# Patient Record
Sex: FEMALE | Race: WHITE | Employment: FULL TIME | URBAN - NONMETROPOLITAN AREA
[De-identification: names, ages, dates, MRNs, and addresses within clinical notes are randomized per-mention and may not be internally consistent; named-entity substitution may affect disease eponyms.]

---

## 2022-12-02 ENCOUNTER — HOSPITAL ENCOUNTER (OUTPATIENT)
Dept: GENERAL RADIOLOGY | Age: 52
End: 2022-12-02
Payer: COMMERCIAL

## 2022-12-02 ENCOUNTER — HOSPITAL ENCOUNTER (OUTPATIENT)
Age: 52
Discharge: HOME | End: 2022-12-02
Payer: COMMERCIAL

## 2022-12-02 DIAGNOSIS — M79.18 MUSCULOSKELETAL PAIN: ICD-10-CM

## 2022-12-02 DIAGNOSIS — M25.511 RIGHT SHOULDER PAIN, UNSPECIFIED CHRONICITY: ICD-10-CM

## 2022-12-02 PROCEDURE — 73030 X-RAY EXAM OF SHOULDER: CPT

## 2023-01-13 ENCOUNTER — HOSPITAL ENCOUNTER (OUTPATIENT)
Dept: PHYSICAL THERAPY | Age: 53
Setting detail: THERAPIES SERIES
Discharge: HOME | End: 2023-01-13
Payer: COMMERCIAL

## 2023-01-13 PROCEDURE — 97161 PT EVAL LOW COMPLEX 20 MIN: CPT

## 2023-01-13 PROCEDURE — 97110 THERAPEUTIC EXERCISES: CPT

## 2023-01-15 NOTE — PROGRESS NOTES
Phone: 468 [facility]          Fax: [phone]                      Outpatient Physical Therapy                                                                      Evaluation  Date: 2023  Patient: Anson Oppenheim  : 1970  [facility] #: [id number]    Referring Physician: Tonny Brito DO     Medical Diagnosis: R shoulder pain/back pain    Treatment Diagnosis: R shoulder pain, R rotator cuff tendinitis  Onset Date: 22  PT Insurance Information: Dagoberto  Total # of Visits Approved: 6   Total # of Visits to Date: 1  No Show: 0  Canceled Appointment: 0     Subjective  Subjective: Patient reports injuring her shoulder when she was attempting to start a lawnmower. She reports anterior and posterior shoulder pain that ranges from 0/10 at best to 9/10 at worst.  Aggravating factors: reaching overhead, reaching her upper back, reaching behind her back, quick sudden movements. She reports decreased pain with rest, medication, heat.   Additional Pertinent Hx: nothing significant      Palpation:   Right Shoulder Palpation: tenderness at proximal biceps, supraspinatus    Objective    AROM    General AROM UE: AROM Assessed  AROM LUE (degrees)  L Shoulder Flexion (0-180): 168  L Shoulder ABduction (0-180): 156  L Shoulder Int Rotation  (0-70): 57  L Shoulder Ext Rotation  (0-90): 80  L Elbow Flexion (0-145): 145  L Elbow Extension (145-0): 0  R shoulder flexion: 108  R shoulder abduction: 78  R shoulder internal rotation: to belly in scapular plane  R shoulder external rotation: 25* in scapular plane  R elbow flexion: 145  R elbow extension: 0       Special Tests:   Special Tests: Performed  Impingement Tests  Neer Test: R (+)  Lv Darian Test: R (+)  Belly Press Test: R (+)  Shawna Sign: R (-)  Drop Arm: R (-)  Empty Can: R (+)          Strength RUE  R Shoulder Flexion: 2+/5  R Shoulder ABduction: 2+/5  R Shoulder Internal Rotation: 4/5  R Shoulder External Rotation: 4+/5  R Elbow Flexion: 4/5  Strength LUE  L Shoulder Flexion: 4+/5  L Shoulder ABduction: 4+/5  L Shoulder Internal Rotation: 5/5  L Shoulder External Rotation: 5/5  L Elbow Flexion: 5/5           Exercises:  Exercise 1: HEP: supine ER with towel x10, table slides x10, posterior capsule stretch 2x30 seconds, scapular retractions x10, shoulder shrugs x10    Manual:  Joint Mobilization: Grade II/III glenohumeral joint mobs: inferior, lateral distraction, posterior  Other: PROM: flexion, external rotation      Functional Outcome Measures  Pt disability report:     Assessment  Body Structures, Functions, Activity Limitations Requiring Skilled Therapeutic Intervention: Decreased functional mobility , Decreased ADL status, Decreased ROM, Decreased strength, Decreased endurance, Decreased high-level IADLs, Increased pain, Decreased posture  Assessment: The patient is a 50 y.o. female who reports injuring her R shoulder in May when pulling to start a lawnmower. She demonstrates decreased R shoulder ROM with increased pain with eccentric lowering, positive impringement signs, and decreased R UE strength. She would benefit from skilled PT to address her deficits to improve functional mobility. Therapy Prognosis: Good        Decision Making: Low Complexity    Patient Education  Patient Education: PT POC, HEP  Pt verbalized/demonstrated good understanding:     [X] Yes         [] No, pt required further clarification.       Goals  Short Term Goals  Time Frame for Short Term Goals: 2 weeks  Short Term Goal 1: Patient will be initiated with a HEP  Short Term Goal 2: Patient will improve R active flexion ROM to >130* for ADLs    Long Term Goals  Time Frame for Long Term Goals : 4 weeks  Long Term Goal 1: Patient will be independent and compliant with a HEP  Long Term Goal 2: Patient will improve R shoulder ROM to match L for ADLs  Long Term Goal 3: Patient will improve R shoulder strength to </= 4/5 in all major joints and planes  Long Term Goal 4: Patient will report 70% improvement in overall symptoms and function.     Patient Goals : Decrease pain    Minutes Tracking:  Time In: 3864  Time Out: 4011  Minutes: 51  Timed Code Treatment Minutes: 1031 Cooper Belcher, JANET    1/13/2023

## 2023-01-15 NOTE — PLAN OF CARE
[facility]           Phone: [phone]             Outpatient Physical Therapy  Fax: [phone]                                           Date: 2023  Patient: Talia Cole : 1970 [facility] #: [id number]   Referring Physician: Sergo De Luna DO      [x] Plan of Care   [] Updated Plan of Care    Dates of Service to Include: 2023 to 23    Diagnosis:  R shoulder pain/back pain    Rehab (Treatment) Diagnosis:  R shoulder pain, R rotator cuff tendinitis             Onset Date:  22    Attendance  Total # of Visits to Date: 1 No Show: 0 Canceled Appointment: 0    Assessment  Body Structures, Functions, Activity Limitations Requiring Skilled Therapeutic Intervention: Decreased functional mobility , Decreased ADL status, Decreased ROM, Decreased strength, Decreased endurance, Decreased high-level IADLs, Increased pain, Decreased posture  Assessment: The patient is a 50 y.o. female who reports injuring her R shoulder in May when pulling to start a lawnmower. She demonstrates decreased R shoulder ROM with increased pain with eccentric lowering, positive impringement signs, and decreased R UE strength. She would benefit from skilled PT to address her deficits to improve functional mobility. Goals  Short Term Goals  Time Frame for Short Term Goals: 2 weeks  Short Term Goal 1: Patient will be initiated with a HEP  Short Term Goal 2: Patient will improve R active flexion ROM to >130* for ADLs  Long Term Goals  Time Frame for Long Term Goals : 4 weeks  Long Term Goal 1: Patient will be independent and compliant with a HEP  Long Term Goal 2: Patient will improve R shoulder ROM to match L for ADLs  Long Term Goal 3: Patient will improve R shoulder strength to </= 4/5 in all major joints and planes  Long Term Goal 4: Patient will report 70% improvement in overall symptoms and function. Prognosis  Therapy Prognosis: Good    Treatment Plan   Plan Frequency: 1-2  Plan weeks: 4  [x] HP/CP      [x] Electrical Stim   [x] Therapeutic Exercise      [] Gait Training  [] Aquatics   [x] Ultrasound         [x] Patient Education/HEP   [x] Manual Therapy  [] Traction    [x] Neuro-china        [] Soft Tissue Mobs            [] Home TENS  [] Iontophoresis    [] Orthotic casting/fitting      [] Dry Needling  [] Blood Flow Restriction             Electronically signed by: Jolly Martinez PT, DPT    Date: 1/13/2023      ______________________________________ Date: 1/13/2023   Physician Signature

## 2023-01-20 ENCOUNTER — HOSPITAL ENCOUNTER (OUTPATIENT)
Dept: PHYSICAL THERAPY | Age: 53
Setting detail: THERAPIES SERIES
Discharge: HOME | End: 2023-01-20
Payer: COMMERCIAL

## 2023-01-20 PROCEDURE — 97110 THERAPEUTIC EXERCISES: CPT

## 2023-01-20 PROCEDURE — G0283 ELEC STIM OTHER THAN WOUND: HCPCS

## 2023-01-20 PROCEDURE — 97140 MANUAL THERAPY 1/> REGIONS: CPT

## 2023-01-20 NOTE — PROGRESS NOTES
Phone: [facility]           Fax: [phone]                           Outpatient Physical Therapy                                                                            Daily Note    Patient: Guerita Keith : 1970  CSN #: [id number]   Referring Physician: Sydney Light DO    Date: 2023       Treatment Diagnosis: R shoulder pain, R rotator cuff tendinitis    Onset Date: 22  PT Insurance Information: Dagoberto  Total # of Visits Approved: 6 Per Physician Order  Total # of Visits to Date: 2  No Show: 0  Canceled Appointment: 0      Pre-Treatment Pain:  8/10  Subjective: Patient reports increased back pain since starting exercises. She reports 8/10 levator and upper trapezius pain coming into therapy today. Exercises:  Exercise 1: HEP: supine ER with towel x10, table slides x10, posterior capsule stretch 2x30 seconds, scapular retractions x10, shoulder shrugs x10 (D/C shrugs)  Exercise 2: Cane extension x10, external rotation x10  Exercise 3: Pulley x3 minutes  Exercise 4: Posterior capsule stretch 2x20 seconds  Exercise 5: therapy ball rollout x10    Manual:  Joint Mobilization: Grade II/III glenohumeral joint mobs: inferior, lateral distraction. Scap mobilization with cane  Soft Tissue Mobilizaton: Heated thermoprobe massage to R upper trap and levator x10 minutes  Other: PROM: flexion, external rotation    Modalities:   IFC with HP x15 minutes to decrease pain and soreness. Assessment  Body Structures, Functions, Activity Limitations Requiring Skilled Therapeutic Intervention: Decreased functional mobility , Decreased ADL status, Decreased ROM, Decreased strength, Decreased endurance, Decreased high-level IADLs, Increased pain, Decreased posture  Assessment: Patient reports increased pain since starting flexibility exercises. D/C shoulder shrugs and advised patient to perform a ball rollout or passive flexion stretch at home.   She required cueing for performance of exercises. Heated thermoprobe along with IFC and heat to decrease pain. Activity Tolerance  Activity Tolerance: Patient tolerated treatment well    Patient Education  Patient Education: PT POC, HEP  Pt verbalized/demonstrated good understanding:     [x] Yes         [] No, pt required further clarification. Post Treatment Pain:  5/10      Plan  Plan Frequency: 1-2  Plan weeks: 4       Goals  (Total # of Visits to Date: 2)      Short Term Goals  Time Frame for Short Term Goals: 2 weeks  Short Term Goal 1: Patient will be initiated with a HEP -MET  Short Term Goal 2: Patient will improve R active flexion ROM to >130* for ADLs    Long Term Goals  Time Frame for Long Term Goals : 4 weeks  Long Term Goal 1: Patient will be independent and compliant with a HEP  Long Term Goal 2: Patient will improve R shoulder ROM to match L for ADLs  Long Term Goal 3: Patient will improve R shoulder strength to </= 4/5 in all major joints and planes  Long Term Goal 4: Patient will report 70% improvement in overall symptoms and function.     Minutes Tracking:  Time In: 8885  Time Out: 1467 [facility]  Minutes: 51  Timed Code Treatment Minutes: 1031 Cooper Belcher, DPT     Date: 1/20/2023

## 2023-01-27 ENCOUNTER — HOSPITAL ENCOUNTER (OUTPATIENT)
Dept: PHYSICAL THERAPY | Age: 53
Setting detail: THERAPIES SERIES
Discharge: HOME | End: 2023-01-27
Payer: COMMERCIAL

## 2023-01-27 PROCEDURE — 97110 THERAPEUTIC EXERCISES: CPT

## 2023-01-27 PROCEDURE — 97140 MANUAL THERAPY 1/> REGIONS: CPT

## 2023-01-28 NOTE — PROGRESS NOTES
Phone: [facility]           Fax: [phone]                           Outpatient Physical Therapy                                                                            Daily Note    Patient: Aayush Foster : 1970  CSN #: [id number]   Referring Physician: Chula Holman DO    Date: 2023       Treatment Diagnosis: R shoulder pain, R rotator cuff tendinitis    Onset Date: 22  PT Insurance Information: Dagoberto  Total # of Visits Approved: 6 Per Physician Order  Total # of Visits to Date: 3  No Show: 0  Canceled Appointment: 0      Pre-Treatment Pain:  5/10  Subjective: Patient reports she is a little better this week compared to last.  She reports continued posterior and anterior shoulder pain especially when reaching behind her back. Exercises:  Exercise 2: Cane extension x10, external rotation x10  Exercise 3: Pulley x3 minutes  Exercise 4: Posterior capsule stretch 2x20 seconds  Exercise 5: Therapy ball circles x45 sec ea direction, therapy ball rollouts x10  Exercise 6: YTB rows/shoulder extension 2x10 ea  Exercise 7: YTB external rotation walkouts with towel roll x5    Manual:  Soft Tissue Mobilizaton: Heated thermoprobe massage to R upper trap and levator x10 minutes    Assessment  Body Structures, Functions, Activity Limitations Requiring Skilled Therapeutic Intervention: Decreased functional mobility , Decreased ADL status, Decreased ROM, Decreased strength, Decreased endurance, Decreased high-level IADLs, Increased pain, Decreased posture  Assessment: The patient reports she felt a little better this week. She reports continued pain when reaching up her back, but demonstrates improvement in shoulder flexion ROM. Progressed scapular strengthening exercises with patient reporting increased posterior shoulder pain following tx session. Used heated thermoprobe massage following her tx session to decrease pain.   She reports gentle stretching and the beginning of scapular strengthening continue to increase discomfort. She was educated to continue with her HEP this week and if there is no improvement, will refer back to physician. Activity Tolerance  Activity Tolerance: Patient limited by pain    Patient Education  Patient Education: Continue HEP, new exercise rationale  Pt verbalized/demonstrated good understanding:     [x] Yes         [] No, pt required further clarification. Post Treatment Pain:  6/10      Plan  Plan Frequency: 1-2  Plan weeks: 4       Goals  (Total # of Visits to Date: 3)      Short Term Goals  Time Frame for Short Term Goals: 2 weeks  Short Term Goal 1: Patient will be initiated with a HEP -MET  Short Term Goal 2: Patient will improve R active flexion ROM to >130* for ADLs -MET    Long Term Goals  Time Frame for Long Term Goals : 4 weeks  Long Term Goal 1: Patient will be independent and compliant with a HEP  Long Term Goal 2: Patient will improve R shoulder ROM to match L for ADLs  Long Term Goal 3: Patient will improve R shoulder strength to </= 4/5 in all major joints and planes  Long Term Goal 4: Patient will report 70% improvement in overall symptoms and function.     Minutes Tracking:  Time In: 5654  Time Out: 215 [facility]  Minutes: 42  Timed Code Treatment Minutes: [place]al [place] 68, [place], DPT     Date: 1/27/2023

## 2023-02-03 ENCOUNTER — HOSPITAL ENCOUNTER (OUTPATIENT)
Dept: PHYSICAL THERAPY | Age: 53
Setting detail: THERAPIES SERIES
Discharge: HOME | End: 2023-02-03
Payer: COMMERCIAL

## 2023-02-03 PROCEDURE — 97140 MANUAL THERAPY 1/> REGIONS: CPT

## 2023-02-03 PROCEDURE — 97110 THERAPEUTIC EXERCISES: CPT

## 2023-02-03 NOTE — PROGRESS NOTES
Phone: [phone]                 [facility]           Fax: [phone]                           Outpatient Physical Therapy                                                                            Daily Note    Patient: Alicja Faith : 1970  CSN #: [id number]   Referring Physician: Koko Mahoney DO    Date: 2/3/2023     Treatment Diagnosis: R shoulder pain, R rotator cuff tendinitis    Onset Date: 22  PT Insurance Information: Dagoberto  Total # of Visits Approved: 6 Per Physician Order  Total # of Visits to Date: 4  No Show: 0  Canceled Appointment: 0    Pre-Treatment Pain:  5/10  Subjective: Pt reports she is still getting alot of pain but the pain seems to change with activity.  Pt rates current pain a 5/10.    Exercises:  Exercise 1: HEP: supine ER with towel x10, table slides x10, posterior capsule stretch 2x30 seconds, scapular retractions x10, shoulder shrugs x10 (D/C shrugs)  Exercise 2: Cane extension x10, external rotation x10  Exercise 3: Pulley x3 minutes  Exercise 4: Posterior capsule stretch 2x20 seconds  Exercise 8: thoracic roller stretch 10x 10 sec    Manual:  Joint Mobilization: Grade II/III glenohumeral joint mobs: inferior, lateral distraction.  Scap mobilization with cane  Soft Tissue Mobilizaton: Heated thermoprobe massage to R upper trap and levator x10 minutes  Other: PROM: flexion, external rotation    Assessment  Assessment: Pt continues to report wide spread pain with palpation througout R shoulder structures.  Point tenderenss noted on medial border of R scap, R UT/ LS and R pec region.  Continued working on increased motion with pain noted throughout PROM.  Will continue to progress as tolerable.    Activity Tolerance  Activity Tolerance: Patient limited by pain    Patient Education  Patient Education: STretching rational, manual techniques.  Pt verbalized/demonstrated good understanding:     [x] Yes         [] No, pt required further  clarification. Post Treatment Pain:  5/10    Plan  Plan Frequency: 1-2  Plan weeks: 4     Goals  (Total # of Visits to Date: 4)      Short Term Goals  Time Frame for Short Term Goals: 2 weeks  Short Term Goal 1: Patient will be initiated with a HEP -MET  Short Term Goal 2: Patient will improve R active flexion ROM to >130* for ADLs -MET    Long Term Goals  Time Frame for Long Term Goals : 4 weeks  Long Term Goal 1: Patient will be independent and compliant with a HEP  Long Term Goal 2: Patient will improve R shoulder ROM to match L for ADLs  Long Term Goal 3: Patient will improve R shoulder strength to </= 4/5 in all major joints and planes  Long Term Goal 4: Patient will report 70% improvement in overall symptoms and function.     Minutes Tracking:  Time In: 1107  Time Out: 1157  Minutes: 50  Timed Code Treatment Minutes: [address] [place], [place]     Date: 2/3/2023

## 2023-02-10 ENCOUNTER — HOSPITAL ENCOUNTER (OUTPATIENT)
Dept: PHYSICAL THERAPY | Age: 53
Setting detail: THERAPIES SERIES
Discharge: HOME | End: 2023-02-10
Payer: COMMERCIAL

## 2023-02-10 NOTE — PLAN OF CARE
[facility]           Phone: [phone]             Outpatient Physical Therapy  Fax: [phone]                                           Date: 2/10/2023  Patient: Nasim Jung : 1970 CSN #: [id number]   Referring Physician: Dennie Hurst, DO      [] Plan of Care   [x] Updated Plan of Care    Dates of Service to Include: 2/10/2023 to 23    Diagnosis:       Rehab (Treatment) Diagnosis:  R shoulder pain, R rotator cuff tendinitis             Onset Date:  22    Attendance  Total # of Visits to Date: 5 No Show: 0 Canceled Appointment: 0    Assessment  Assessment: Pt reports slight improvement since begining therapy in regards to thoracic pain but R shoulder pain remains at a 7/10 depending on activity level. Less point tenderness noted throughout R scap and UT region. R UE AROM remains limited d/t pain. R shoulder flexion 110*, ABD 87*, R shoulder IR behind back to SI joint and R shoulder ER behind back thumb to base of skull. Pt plans on speaking with Dr about MRI for shoulder d/t little to no improvement but would like to continue therapy d/t relief in scap region discomfort caused from compensation. Goals  Short Term Goals  Time Frame for Short Term Goals: 2 weeks  Short Term Goal 1: Patient will be initiated with a HEP -MET  Short Term Goal 2: Patient will improve R active flexion ROM to >130* for ADLs -MET  Long Term Goals  Time Frame for Long Term Goals : 4 weeks  Long Term Goal 1: Patient will be independent and compliant with a HEP  Long Term Goal 2: Patient will improve R shoulder ROM to match L for ADLs  Long Term Goal 3: Patient will improve R shoulder strength to </= 4/5 in all major joints and planes  Long Term Goal 4: Patient will report 70% improvement in overall symptoms and function.      Prognosis  Fair    Treatment Plan   Plan Frequency: 1-2  Plan weeks: 4  [x] HP/CP [x] Electrical Stim   [x] Therapeutic Exercise      [] Gait Training  [] Aquatics   [] Ultrasound         [x] Patient Education/HEP   [x] Manual Therapy  [] Traction    [x] Neuro-china        [x] Soft Tissue Mobs            [] Home TENS  [] Iontophoresis    [] Orthotic casting/fitting      [] Dry Needling  [] Blood Flow Restriction             Electronically signed by: Noemí Adhikari PT [email or web address]    Date: 2/10/2023      ______________________________________ Date: 2/10/2023   Physician Signature

## 2023-02-10 NOTE — PROGRESS NOTES
Phone: [facility]           Fax: [phone]                           Outpatient Physical Therapy                                                                            Daily Note    Patient: Ravi Bee : 1970  CSN #: [id number]   Referring Physician: Kecia Rg DO    Date: 2/10/2023     Treatment Diagnosis: R shoulder pain, R rotator cuff tendinitis    Onset Date: 22  PT Insurance Information: Dagoberto  Total # of Visits Approved: 6 Per Physician Order  Total # of Visits to Date: 5  No Show: 0  Canceled Appointment: 0    Pre-Treatment Pain:  0/10  Subjective: Pt states her shoulder is really about the same. Pt reports she does notice a little improvement in her shoulder blade but the shoulder itself isnt much better. Exercises:  Exercise 1: HEP: supine ER with towel x10, table slides x10, posterior capsule stretch 2x30 seconds, scapular retractions x10, shoulder shrugs x10 (D/C shrugs)  Exercise 2: Cane extension x10, external rotation x10  Exercise 3: Pulley x3 minutes  Exercise 4: Posterior capsule stretch 2x20 seconds  Exercise 6: YTB rows/shoulder extension 2x10 ea  Exercise 8: thoracic roller stretch 10x 10 sec    Manual:  Soft Tissue Mobilizaton: Heated thermoprobe massage to R upper trap and levator x10 minutes  1 static cup R UT 2 pumps    Assessment  Assessment: Pt reports slight improvement since begining therapy in regards to thoracic pain but R shoulder pain remains at a 7/10 depending on activity level. Less point tenderness noted throughout R scap and UT region but less than prior. R UE AROM remains limited d/t pain. R shoulder flexion 110*, ABD 87*, R shoulder IR behind back to SI joint and R shoulder ER behind back thumb to base of skull.   Pt plans on speaking with  about MRI for shoulder d/t little to no improvement but would like to continue therapy d/t relief in scap region discomfort caused from compensation. Activity Tolerance  Activity Tolerance: Patient limited by pain    Patient Education  Patient Education: Cupping rationale. Pt verbalized/demonstrated good understanding:     [x] Yes         [] No, pt required further clarification. Post Treatment Pain:  0/10    Plan  Plan Frequency: 1-2  Plan weeks: 4     Goals  (Total # of Visits to Date: 5)      Short Term Goals  Time Frame for Short Term Goals: 2 weeks  Short Term Goal 1: Patient will be initiated with a HEP -MET  Short Term Goal 2: Patient will improve R active flexion ROM to >130* for ADLs -MET    Long Term Goals  Time Frame for Long Term Goals : 4 weeks  Long Term Goal 1: Patient will be independent and compliant with a HEP  Long Term Goal 2: Patient will improve R shoulder ROM to match L for ADLs  Long Term Goal 3: Patient will improve R shoulder strength to </= 4/5 in all major joints and planes  Long Term Goal 4: Patient will report 70% improvement in overall symptoms and function.     Minutes Tracking:  Time In: 1102  Time Out: 5899  Minutes: 47  Timed Code Treatment Minutes: 8[address] Issac Mireles [place]     Date: 2/10/2023

## 2023-02-24 ENCOUNTER — HOSPITAL ENCOUNTER (OUTPATIENT)
Dept: PHYSICAL THERAPY | Age: 53
Setting detail: THERAPIES SERIES
Discharge: HOME | End: 2023-02-24
Payer: COMMERCIAL

## 2023-02-24 PROCEDURE — 97140 MANUAL THERAPY 1/> REGIONS: CPT

## 2023-02-24 PROCEDURE — 97110 THERAPEUTIC EXERCISES: CPT

## 2023-02-24 NOTE — PROGRESS NOTES
Phone: Danitza           Fax: [phone]                           Outpatient Physical Therapy                                                                            Daily Note    Patient: Asha Veliz : 1970  CSN #: [id number]   Referring Physician: Shweta Dyson DO    Date: 2023       Treatment Diagnosis: R shoulder pain, R rotator cuff tendinitis    Onset Date: 22  PT Insurance Information: [employer]  Total # of Visits Approved: 6 Per Physician Order  Total # of Visits to Date: 6  No Show: 0  Canceled Appointment: 0      Pre-Treatment Pain:  6/10  Subjective: Patient reports her shoulder pain remains the same ranging from 5/10 to 8/10 at worst.  She reports shoulder blade pain is a little better. Exercises:  Exercise 1: HEP: ER with cane x10, shoulder extension with cane x10, table slides x10, posterior capsule stretch 2x30 seconds  Exercise 2: Cane extension x10, external rotation x10  Exercise 3: Pulley x3 minutes  Exercise 4: Posterior capsule stretch 2x20 seconds    Manual:  Soft Tissue Mobilizaton: Heated thermoprobe massage to R upper trap and levator x10 minutes    Assessment  Body Structures, Functions, Activity Limitations Requiring Skilled Therapeutic Intervention: Decreased functional mobility , Decreased ADL status, Decreased ROM, Decreased strength, Decreased endurance, Decreased high-level IADLs, Increased pain, Decreased posture  Assessment: Patient has attended her initial evaluation and 5 follow-up appointments over the last 6 weeks. She reports scapular pain feels a little better, but shoulder pain remains the same ranging from 5/10 to 8/10. She demonstrates some improvement with active shoulder ROM with flexion measuring 117*, abduction: 96*, external rotation with thumb to T1 and internal rotation thumb to sacrum.  Patient will be put on hold at this time and will return to physician due to continued shoulder pain.    Activity Tolerance  Activity Tolerance: Patient limited by pain    Patient Education  Patient Education: Continue HEP  Pt verbalized/demonstrated good understanding:     [x] Yes         [] No, pt required further clarification. Post Treatment Pain:  6/10      Plan  Plan Frequency: 1-2  Plan weeks: 4       Goals  (Total # of Visits to Date: 6)      Short Term Goals  Time Frame for Short Term Goals: 2 weeks  Short Term Goal 1: Patient will be initiated with a HEP -MET  Short Term Goal 2: Patient will improve R active flexion ROM to >130* for ADLs -MET    Long Term Goals  Time Frame for Long Term Goals : 4 weeks  Long Term Goal 1: Patient will be independent and compliant with a HEP  Long Term Goal 2: Patient will improve R shoulder ROM to match L for ADLs  Long Term Goal 3: Patient will improve R shoulder strength to </= 4/5 in all major joints and planes  Long Term Goal 4: Patient will report 70% improvement in overall symptoms and function.     Minutes Tracking:  Time In: 4110  Time Out: 1586  Minutes: 30  Timed Code Treatment Minutes: Jimmy 41, [place], DPT     Date: 2/24/2023

## 2023-08-26 ENCOUNTER — HOSPITAL ENCOUNTER (EMERGENCY)
Age: 53
Discharge: HOME | End: 2023-08-26
Payer: COMMERCIAL

## 2023-08-26 ENCOUNTER — APPOINTMENT (OUTPATIENT)
Dept: GENERAL RADIOLOGY | Age: 53
End: 2023-08-26
Payer: COMMERCIAL

## 2023-08-26 VITALS
WEIGHT: 185 LBS | BODY MASS INDEX: 28.04 KG/M2 | SYSTOLIC BLOOD PRESSURE: 120 MMHG | HEART RATE: 88 BPM | HEIGHT: 68 IN | OXYGEN SATURATION: 98 % | DIASTOLIC BLOOD PRESSURE: 74 MMHG | TEMPERATURE: 98.5 F | RESPIRATION RATE: 19 BRPM

## 2023-08-26 DIAGNOSIS — S83.8X2A SPRAIN OF OTHER LIGAMENT OF LEFT KNEE, INITIAL ENCOUNTER: Primary | ICD-10-CM

## 2023-08-26 PROCEDURE — 73562 X-RAY EXAM OF KNEE 3: CPT

## 2023-08-26 PROCEDURE — 96372 THER/PROPH/DIAG INJ SC/IM: CPT

## 2023-08-26 PROCEDURE — 99284 EMERGENCY DEPT VISIT MOD MDM: CPT

## 2023-08-26 PROCEDURE — 6360000002 HC RX W HCPCS: Performed by: PHYSICIAN ASSISTANT

## 2023-08-26 RX ORDER — KETOROLAC TROMETHAMINE 30 MG/ML
60 INJECTION, SOLUTION INTRAMUSCULAR; INTRAVENOUS ONCE
Status: COMPLETED | OUTPATIENT
Start: 2023-08-26 | End: 2023-08-26

## 2023-08-26 RX ORDER — MONTELUKAST SODIUM 10 MG/1
10 TABLET ORAL NIGHTLY
COMMUNITY

## 2023-08-26 RX ORDER — TRAZODONE HYDROCHLORIDE 100 MG/1
100 TABLET ORAL NIGHTLY
COMMUNITY

## 2023-08-26 RX ADMIN — KETOROLAC TROMETHAMINE 60 MG: 30 INJECTION, SOLUTION INTRAMUSCULAR at 13:28

## 2023-08-26 ASSESSMENT — ENCOUNTER SYMPTOMS
RESPIRATORY NEGATIVE: 1
GASTROINTESTINAL NEGATIVE: 1

## 2023-08-26 ASSESSMENT — PAIN SCALES - GENERAL: PAINLEVEL_OUTOF10: 8

## 2023-08-26 ASSESSMENT — PAIN DESCRIPTION - ORIENTATION: ORIENTATION: LEFT

## 2023-08-26 ASSESSMENT — PAIN - FUNCTIONAL ASSESSMENT: PAIN_FUNCTIONAL_ASSESSMENT: 0-10

## 2023-08-26 ASSESSMENT — PAIN DESCRIPTION - DESCRIPTORS: DESCRIPTORS: ACHING;DISCOMFORT

## 2023-08-26 ASSESSMENT — PAIN DESCRIPTION - LOCATION: LOCATION: KNEE

## 2023-08-26 ASSESSMENT — LIFESTYLE VARIABLES
HOW OFTEN DO YOU HAVE A DRINK CONTAINING ALCOHOL: MONTHLY OR LESS
HOW MANY STANDARD DRINKS CONTAINING ALCOHOL DO YOU HAVE ON A TYPICAL DAY: 1 OR 2

## 2023-08-26 ASSESSMENT — PAIN DESCRIPTION - PAIN TYPE: TYPE: ACUTE PAIN

## 2023-08-26 NOTE — DISCHARGE INSTRUCTIONS
Follow-up with orthopedic doctor 7 to 10 days for reevaluation, wear knee immobilizer as directed take Tylenol Motrin as directed discomfort. Promptly return to emergency department for new, changing or worsening symptoms or other concerns.

## 2023-08-26 NOTE — ED PROVIDER NOTES
[address] [facility] ED  EMERGENCY DEPARTMENT ENCOUNTER      Pt Name: Joyce Moses  MRN: [id number]  [address] [facility], [place] 1970  Date of evaluation: 2023  [place] [facility] [place] Complaint   Patient presents with    Knee Pain     Patient ambulatory to ER for L knee pain after tripping last Saturday denies LOC/head injury         HISTORY OF PRESENT ILLNESS   (Location/Symptom, Timing/Onset, Context/Setting, Quality, Duration, Modifying Factors, Severity)  Note limiting factors. Joyce Moses is a 48 y.o. female who presents to the emergency department stable condition ambulatory for evaluation of left knee pain sharp and achy worsened with movements patient states 1 week ago while working on a [facility] [employer] home she tripped twisting her left knee. Patient reports she is had pain ever since not relieved with OTC medicines. Patient denies redness to skin over knee, history of fever or other pain sites or complaints. HPI    Nursing Notes were reviewed. REVIEW OF SYSTEMS    (2-9 systems for level 4, 10 or more for level 5)     Review of Systems   Constitutional: Negative. Respiratory: Negative. Gastrointestinal: Negative. Genitourinary: Negative. Musculoskeletal:         See HPI   Neurological: Negative. Except as noted above the remainder of the review of systems was reviewed and negative. PAST MEDICAL HISTORY   History reviewed. No pertinent past medical history. SURGICAL HISTORY       Past Surgical History:   Procedure Laterality Date     SECTION      x2         CURRENT MEDICATIONS       Previous Medications    MONTELUKAST (SINGULAIR) 10 MG TABLET    Take 1 tablet by mouth nightly    TRAZODONE (DESYREL) 100 MG TABLET    Take 1 tablet by mouth nightly       ALLERGIES     Clindamycin/lincomycin    FAMILY HISTORY     History reviewed. No pertinent family history.        SOCIAL HISTORY       Social History     Socioeconomic History

## 2023-11-30 ENCOUNTER — HOSPITAL ENCOUNTER (OUTPATIENT)
Dept: MRI IMAGING | Age: 53
Discharge: HOME | End: 2023-12-02
Payer: COMMERCIAL

## 2023-11-30 DIAGNOSIS — M25.562 ACUTE PAIN OF LEFT KNEE: ICD-10-CM

## 2023-11-30 PROCEDURE — 73721 MRI JNT OF LWR EXTRE W/O DYE: CPT

## 2024-05-08 ENCOUNTER — APPOINTMENT (OUTPATIENT)
Dept: CT IMAGING | Age: 54
End: 2024-05-08
Payer: COMMERCIAL

## 2024-05-08 ENCOUNTER — HOSPITAL ENCOUNTER (EMERGENCY)
Age: 54
Discharge: HOME | End: 2024-05-08
Attending: EMERGENCY MEDICINE
Payer: COMMERCIAL

## 2024-05-08 VITALS
HEART RATE: 80 BPM | DIASTOLIC BLOOD PRESSURE: 54 MMHG | RESPIRATION RATE: 18 BRPM | OXYGEN SATURATION: 98 % | TEMPERATURE: 98 F | SYSTOLIC BLOOD PRESSURE: 118 MMHG

## 2024-05-08 DIAGNOSIS — S09.90XA CLOSED HEAD INJURY, INITIAL ENCOUNTER: ICD-10-CM

## 2024-05-08 DIAGNOSIS — S16.1XXA NECK STRAIN, INITIAL ENCOUNTER: ICD-10-CM

## 2024-05-08 DIAGNOSIS — S01.312A LACERATION OF LEFT EXTERNAL EAR, INITIAL ENCOUNTER: Primary | ICD-10-CM

## 2024-05-08 PROCEDURE — 99284 EMERGENCY DEPT VISIT MOD MDM: CPT

## 2024-05-08 PROCEDURE — 6370000000 HC RX 637 (ALT 250 FOR IP): Performed by: EMERGENCY MEDICINE

## 2024-05-08 PROCEDURE — 70450 CT HEAD/BRAIN W/O DYE: CPT

## 2024-05-08 PROCEDURE — 72125 CT NECK SPINE W/O DYE: CPT

## 2024-05-08 RX ORDER — ACETAMINOPHEN 325 MG/1
650 TABLET ORAL ONCE
Status: COMPLETED | OUTPATIENT
Start: 2024-05-08 | End: 2024-05-08

## 2024-05-08 RX ADMIN — ACETAMINOPHEN 650 MG: 325 TABLET, FILM COATED ORAL at 21:07

## 2024-05-08 ASSESSMENT — PAIN DESCRIPTION - ORIENTATION: ORIENTATION: LEFT

## 2024-05-08 ASSESSMENT — PAIN SCALES - GENERAL: PAINLEVEL_OUTOF10: 7

## 2024-05-08 ASSESSMENT — PAIN - FUNCTIONAL ASSESSMENT: PAIN_FUNCTIONAL_ASSESSMENT: 0-10

## 2024-05-08 ASSESSMENT — PAIN DESCRIPTION - PAIN TYPE: TYPE: ACUTE PAIN

## 2024-05-08 ASSESSMENT — PAIN DESCRIPTION - LOCATION: LOCATION: NECK;EAR

## 2024-05-08 ASSESSMENT — PAIN DESCRIPTION - DESCRIPTORS: DESCRIPTORS: SORE

## 2024-05-09 NOTE — ED PROVIDER NOTES
have NOT CHANGED    Details   traZODone (DESYREL) 100 MG tablet Take 1 tablet by mouth nightly      montelukast (SINGULAIR) 10 MG tablet Take 1 tablet by mouth nightly             ALLERGIES     Clindamycin/lincomycin    FAMILY HISTORY     No family history on file.       SOCIAL HISTORY       Social History     Socioeconomic History    Marital status:    Tobacco Use    Smoking status: Never    Smokeless tobacco: Never   Vaping Use    Vaping Use: Never used   Substance and Sexual Activity    Alcohol use: Yes     Comment: socially    Drug use: Never       SCREENINGS        [place] Coma Scale  Eye Opening: Spontaneous  Best Verbal Response: Oriented  Best Motor Response: Obeys commands  [place] Coma Scale Score: 15               PHYSICAL EXAM    (up to 7 for level 4, 8 or more for level 5)     ED Triage Vitals [05/08/24 2020]   BP Temp Temp Source Pulse Respirations SpO2 Height Weight   (!) 118/54 98 °F (36.7 °C) Tympanic 80 18 98 % -- --       Physical Exam  Vitals and nursing note reviewed.   Constitutional:       General: She is not in acute distress.     Appearance: She is not toxic-appearing.   HENT:      Head: Normocephalic and atraumatic.      Right Ear: Tympanic membrane normal.      Left Ear: Tympanic membrane normal.      Ears:      Comments: Left approximate 1.5 cm superficial laceration external ear where the helix attaches to the scalp.  No active bleeding.  No foreign body or debris.  Linear superficial laceration     Nose: Nose normal.      Mouth/Throat:      Mouth: Mucous membranes are moist.   Eyes:      Conjunctiva/sclera: Conjunctivae normal.      Pupils: Pupils are equal, round, and reactive to light.   Neck:      Comments: No midline tenderness no step-offs or deformities.  Patient does have bilateral lateral muscular tenderness on exam  Cardiovascular:      Rate and Rhythm: Normal rate and regular rhythm.   Pulmonary:      Effort: Pulmonary effort is normal. No respiratory distress.

## 2024-05-09 NOTE — DISCHARGE INSTRUCTIONS
No water to skin adhesive for 48 hours.  Tylenol and or Motrin as needed for aches and pains in her upper back or neck.  May use over-the-counter product such as IcyHot Tiger balm or similar as needed and directed.  Heat or ice for 5 to 10-minute intervals as desired.  Follow-up with your primary care provider in 3 to 5 days if symptoms have not resolved please seek medical attention immediately for any signs of infection from your laceration any headaches nausea vomiting extremity weakness numbness tingling or any other acute concerns

## 2025-07-14 ENCOUNTER — TRANSCRIBE ORDERS (OUTPATIENT)
Dept: ADMISSION | Age: 55
End: 2025-07-14

## 2025-07-14 ENCOUNTER — HOSPITAL ENCOUNTER (OUTPATIENT)
Dept: GENERAL RADIOLOGY | Age: 55
Discharge: HOME | End: 2025-07-16
Payer: COMMERCIAL

## 2025-07-14 DIAGNOSIS — M25.551 RIGHT HIP PAIN: ICD-10-CM

## 2025-07-14 DIAGNOSIS — M25.551 RIGHT HIP PAIN: Primary | ICD-10-CM

## 2025-07-14 PROCEDURE — 73502 X-RAY EXAM HIP UNI 2-3 VIEWS: CPT
